# Patient Record
(demographics unavailable — no encounter records)

---

## 2025-06-26 NOTE — HISTORY OF PRESENT ILLNESS
[FreeTextEntry1] : Lazaro is a 63-year-old gentleman +FH, HTN, HLD here for cardiac evaluation. NO regular exercise. Works two jobs. Son is phlebotomist here. Parttime at [employer] so there he walks. Noticed now that he gets dizziness at times especially on stairs. NO CP or palpitations.  Dr. Carlos Manuel Avery in [place].

## 2025-06-26 NOTE — DISCUSSION/SUMMARY
[FreeTextEntry1] : The patient is a 63-year-old gentleman strong FH, HTN, HLD with new exertional symptoms of concern.  #1 CV- Start with ECHO and exercise stress test, low threshold cardiac CT #2 HTN- c/w amlodipine 5mg, lisinopril 10/12.5mg #3 HLD- c/w atorvastatin 40mg #4 General- obtain labs from PCP. [EKG obtained to assist in diagnosis and management of assessed problem(s)] : EKG obtained to assist in diagnosis and management of assessed problem(s)